# Patient Record
(demographics unavailable — no encounter records)

---

## 2024-10-11 NOTE — HISTORY OF PRESENT ILLNESS
[de-identified] : S/P right carpal and cubital tunnel release  DOS: 09/30/24 [de-identified] : Returns for follow-up. [de-identified] : Incisions are healing well with slight maceration at the area of the right wrist carpal tunnel incision however no signs of infection.  She tolerates range of motion of the right elbow and right wrist well with no discomfort.  She is able to make a full composite fist with very minimal stiffness. [de-identified] : We have discussed prophylactic antibiotic.  Proper wound care discussed.  She will commence occupational therapy to further optimize her outcome.  A prescription of [Bactrim] has been given to the patient. The risks, side effects, benefits and black box warnings were discussed.  The patient understands the risk profile and would like to take the medication. [de-identified] : 1.  Bactrim as above. 2.  Commence OT.  Follow-up in 4 weeks as per patient request.  Patient would like to discuss left-sided symptoms next visit.

## 2024-11-13 NOTE — HISTORY OF PRESENT ILLNESS
[de-identified] : S/P right carpal and cubital tunnel release DOS: 09/30/24.   [de-identified] : Valencia returns for follow-up status post right sided carpal cubital releases and is delighted.  Denies symptoms. At this point in time patient states that left-sided cubital tunnel symptoms continue to bother her.  She would like to avoid surgery. [de-identified] : Right sided incisions of healed beautifully Examination of the [left] cubital tunnel reveals discomfort with compression with associated numbness and tingling at the fingertips. There is a positive tinel. [de-identified] : ASSESSMENT: The patient comes in today with chronic exacerbated history of left-sided cubital tunnel disease.  With this in mind we have discussed treatment options the patient would like to avoid surgery.  Today she does elect for injection we have discussed the efficacy of injection for this condition.   The patient was adequately and thoroughly informed of my assessment of their current condition(s).  - This may diminish bodily function for the extremity. We discussed prognosis, tx modalities including operative and nonoperative options for the above diagnostic assessment. As always, 2nd opinion is always provided as an option.  When accessible, I was able to review other physicians note(s) including reviewing other tests, imaging results as well as personally view these results for my own interpretation.   Injection:   The risks and benefits of a steroid injection were discussed in detail. The risks include but are not limited to: pain, infection, swelling, flare response, bleeding, subcutaneous fat atrophy, skin depigmentation and/or elevation of blood sugar. The risk of incomplete resolution of symptoms, recurrence and additional intervention was reviewed and considered by the patient. The patient agreed to proceed and under a sterile prep, I injected 1 unit 6mg into 1 cc of a combination of Celestone and Lidocaine into the left cubital tunnel. The patient tolerated the injection well.  The patient was adequately and thoroughly informed of my assessment of their current condition(s).  DISCUSSION: 1.  Injection as above.  Activity modification.  Follow-up 3 months 2.  I am delighted to see she has done so well from right sided releases 3. [x]

## 2024-11-18 NOTE — HISTORY OF PRESENT ILLNESS
[FreeTextEntry1] : 75 year old female w/class 2 obesity, HTN, HLD, Pre-DM, carpal tunnel syndrome, BRIGID, OA presents for obesity medicine follow up.  HX: Pt referred by PCP. Had recent wt gain after being taken off Ozempic/Trulicity (insurance would no longer cover meds). Interested in diet/lifestyle/medication to lose weight.  Limited mobility due to chronic back pain/knee arthritis.  Motivated to improve health and energy level.   Current weight: 208 lbs Max weight: 225 lbs Ht: 5' 1''   Goals: Improve health/enregy. Would like to be 170 lbs    Interim hx: -Pt returns for 2 month f/u visit -Trialed Wellbutrin but had to discontinue quickly due to side effects.  -Never started Metformin, discussed rationale again and plans to start meds -went on a cruise for 8 days recently. been home for a week -reports poor eating while away. Finds this time of year hard and wants to wait until after the holidays to fully dedicate herself. Discussed can try to make some changes now without perfection being the goal -had sx on R hand for carpal tunnel, improved.  -has had increased fatigue since returning from trip. Didn't wear mask when away, back to wearing more consistently and sleeping better -had lost 5 lbs prior to cruise by watching what she ate. More salads, salmon, less fried foods, snacking/sweets with the exception of Halloween.  -walked a lot on the cruise and has continued walking more since home doing shopping for francesca now.  -goal: continue increasing physical activity.    Pt would like to lose weight and motivated to make changes.

## 2024-11-18 NOTE — ASSESSMENT
[FreeTextEntry1] :  Bariatric surgery history:  Obesity co-morbidities: HTN, HLD, Pre-DM, BRIGID, OA  Comorbidities improved or resolved:  Anti-obesity Medications: wegovy (in past)  Obesity medication side effects: none  75 year old female w/class 2 obesity, HTN, HLD, Pre-DM, carpal tunnel syndrome, BRIGID, OA presents for initial obesity medicine evaluation.   Plan: Recommend whole food based eating strategy limiting refined carbs and added fats. Reduce snacking Reduce UPF/takeout foods in diet. Reduce sugar sweetened beverages, increase water intake daily.  Start metformin 750mg ER 1 tab daily>increase to 2 tabs daily after 1 week Not covered for GLP-1/Failed Wellbutrin.  Would benefit from PT given limited physical mobility. Increase PA as abled Wear CPAP mask nightly. Discussed sleep hygiene. Keep phone out of room,. Aim for 6 hours at least nightly.  Meal prep/mindful eating. Discussed mapping out habit loop of snacking.     RTC in 6 weeks in person   PATIENT IS ACTIVELY ENROLLED IN AN EXERCISE AND CALORIC RESTRICTION PROGRAM AND WEIGHT LOSS/BEHAVIORAL MODIFICATION PROGRAM.

## 2024-11-18 NOTE — REASON FOR VISIT
[Follow-Up] : a follow-up visit [Home] : at home, [unfilled] , at the time of the visit. [Medical Office: ([facility])___] : at the medical office located in  [Patient] : the patient [This encounter was initiated by telehealth (audio with video) and converted to telephone (audio only) due to technical difficulties.] : This encounter was initiated by telehealth (audio with video) and converted to telephone (audio only) due to technical difficulties.

## 2024-11-18 NOTE — REVIEW OF SYSTEMS
[MED-ROS-Cons-FT] : + fatigue [MED-ROS-Resp-FT] : +SOB upon exertion [MED-ROS-Musclo-FT] : + back/knee pain

## 2025-01-27 NOTE — PHYSICAL EXAM
[General Appearance - Well Developed] : well developed [Normal Appearance] : normal appearance [General Appearance - Well Nourished] : well nourished [Normal Oropharynx] : normal oropharynx [General Appearance - In No Acute Distress] : no acute distress [IV] : IV [Neck Appearance] : the appearance of the neck was normal [Apical Impulse] : the apical impulse was normal [Heart Rate And Rhythm] : heart rate was normal and rhythm regular [Heart Sounds] : normal S1 and S2 [Heart Sounds Gallop] : no gallops [Murmurs] : no murmurs [Heart Sounds Pericardial Friction Rub] : no pericardial rub [Respiration, Rhythm And Depth] : normal respiratory rhythm and effort [Auscultation Breath Sounds / Voice Sounds] : lungs were clear to auscultation bilaterally [Bowel Sounds] : normal bowel sounds [Abdomen Soft] : soft [Abdomen Tenderness] : non-tender [Abdomen Mass (___ Cm)] : no abdominal mass palpated [Nail Clubbing] : no clubbing of the fingernails [Cyanosis, Localized] : no localized cyanosis [Petechial Hemorrhages (___cm)] : no petechial hemorrhages [] : no ischemic changes [Oriented To Time, Place, And Person] : oriented to person, place, and time [Impaired Insight] : insight and judgment were intact [Affect] : the affect was normal [Mood] : the mood was normal

## 2025-01-27 NOTE — PHYSICAL EXAM
[General Appearance - Well Developed] : well developed [Normal Appearance] : normal appearance [General Appearance - Well Nourished] : well nourished [General Appearance - In No Acute Distress] : no acute distress [Normal Oropharynx] : normal oropharynx [IV] : IV [Neck Appearance] : the appearance of the neck was normal [Apical Impulse] : the apical impulse was normal [Heart Rate And Rhythm] : heart rate was normal and rhythm regular [Heart Sounds] : normal S1 and S2 [Heart Sounds Gallop] : no gallops [Murmurs] : no murmurs [Heart Sounds Pericardial Friction Rub] : no pericardial rub [Respiration, Rhythm And Depth] : normal respiratory rhythm and effort [Auscultation Breath Sounds / Voice Sounds] : lungs were clear to auscultation bilaterally [Bowel Sounds] : normal bowel sounds [Abdomen Soft] : soft [Abdomen Tenderness] : non-tender [Abdomen Mass (___ Cm)] : no abdominal mass palpated [Nail Clubbing] : no clubbing of the fingernails [Cyanosis, Localized] : no localized cyanosis [Petechial Hemorrhages (___cm)] : no petechial hemorrhages [] : no ischemic changes [Oriented To Time, Place, And Person] : oriented to person, place, and time [Impaired Insight] : insight and judgment were intact [Affect] : the affect was normal [Mood] : the mood was normal

## 2025-01-28 NOTE — ASSESSMENT
[FreeTextEntry1] : 74 y/o female who present for sleep follow up for BRIGID, uses CPAP 8cmH2O but c/o dry mouth.  Discussed increasing humidity setting on device, which she will try.  Discussed coming for a mask fitting as this is likely due to mouth breathing during sleep.  Discussed the need to bring in data card so that we can check compliance and therapy data.  Excessive daytime sleepiness and difficulty maintaining sleep: discussed avoiding caffeine 8hrs before bed, utilizing CPAP regularly to avoid respiratory related arousal events.  Discussed sleep hygiene techniques including using the bed/chair only for sleep and to get up and read quietly if she can't re-initiate sleep.  We discussed reducing daytime naps to consolidate her sleep to nocturnal hours.  Patient is receiving her CPAP supplies regularly and following recommended cleaning procedures and changing her equipment on the recommended schedule.  After data download, if she is eligible for machine replacement, we will order a new machine at that time.

## 2025-01-28 NOTE — HISTORY OF PRESENT ILLNESS
[Obstructive Sleep Apnea] : obstructive sleep apnea [Hypersomnia With Sleep Apnea] : hypersomnia with sleep apnea [Snoring] : snoring [Witnessed Apneas] : witnessed sleep apnea [Awakes Unrefreshed] : awakening unrefreshed [Awakening With Dry Mouth] : awakening with dry mouth [Daytime Somnolence] : daytime somnolence [DMS] : DMS [To Bed: ___] : ~he/she~ goes to bed at [unfilled] [Arises: ___] : arises at [unfilled] [Sleep Onset Latency: ___ minutes] : sleep onset latency of [unfilled] minutes reported [Nocturnal Awakenings: ___] : ~he/she~ typically has [unfilled] nocturnal awakenings [TST: ___] : Total sleep time is [unfilled] [Daytime Sleep: ___] : daytime sleep: [unfilled] [Date: ___] : Date of most recent diagnostic polysomnogram: [unfilled] [AHI: ___ per hour] : Apnea-hypopnea index:  [unfilled] per hour [T90%: ___] : T90%: [unfilled]% [CPAP: ___ cmH2O] : CPAP: [unfilled] cmH2O [FreeTextEntry1] :   This ia a 74 y/o female who presents for follow up of BRIGID.  She was initially diagnosed 11/14/2016 by PSG which showed an AHI 18.8 and started on CPAP at 8cmH20 which is supplied by [employer].  She uses a nasal mask and complains of nightly dryness. Her machine data is not available and will need to be downloaded from the SD card from her [employer] which was replaced under the recall on 3/2/2023.  The patient will bring the card to the office for download the next time she is in the area.     The patient uses her CPAP intermittently and on most nights she sleeps in a recliner to start the night as she suffers from low back pain.  She does report feeling better when she uses the CPAP.  She denies morning headaches, sensations of gasping or choking, chest pain, or palpitations.  She reports no changes to her medical history or new medications. She was taking Wegovy but it was discontinued because her coverage ceased for the medication. She reports her usual time to initiate sleep is 8pm and she start her day at 7am but does awaken during the night.  She reports no difficulty initiating sleep but after waking (on average 2x per night) she has difficulty returning to sleep, sometimes taking several hours to fall back asleep.  She reports taking daily naps twice per day for 40-60 minutes each time.    Caffeine intake-she drinks caffeinated tea in the mornings and has caffeinated diet coke in the evening.  She was advised to avoid caffeine intake 8 hrs before bedtime.   EPWORTH SLEEPINESS SCALE   How likely are you to doze off or fall asleep in the situations described below, in contrast to feeling just tired? This refers to your usual way of life in recent times. Even if you haven't done some of these things recently, try to work out how they would have affected you. Use the following scale to choose one most appropriate number for each situation.   Chance of dozing.............Situation 3......................................Sitting and reading 3........................................Watching TV 0........................................Sitting inactive in a public place (eg a theatre or a meeting) 0........................................As a passenger in a car for an hour without a break 3........................................Lying down to rest in the afternoon when circumstances permit 0........................................Sitting and talking to someone 2........................................Sitting quietly after lunch without alcohol 0........................................In a car, while stopped for a few minutes in traffic   11........................................TOTAL SCORE   0 = Never would doze 1 = Slight chance of dozing 2 = Moderate chance of dozing 3 = High chance of dozing __________________________________________ [Frequent Nocturnal Awakening] : no nocturnal awakening [Unintentional Sleep while Active] : no unintentional sleep while active [Unintentional Sleep While Inactive] : no unintentional sleep while inactive [Awakes with Headache] : no headache upon awakening [Recent  Weight Gain] : no recent weight gain [DIS] : no DIS [Unusual Sleep Behavior] : no unusual sleep behavior [Hypersomnolence] : no hypersomnolence [Cataplexy] : no cataplexy [Sleep Paralysis] : no sleep paralysis [Hypnagogic Hallucinations] : no hallucinations when falling asleep [Hypnopompic Hallucinations] : no hallucinations when awakening [ESS] : 11

## 2025-01-28 NOTE — HISTORY OF PRESENT ILLNESS
[Obstructive Sleep Apnea] : obstructive sleep apnea [Hypersomnia With Sleep Apnea] : hypersomnia with sleep apnea [Snoring] : snoring [Witnessed Apneas] : witnessed sleep apnea [Awakes Unrefreshed] : awakening unrefreshed [Awakening With Dry Mouth] : awakening with dry mouth [Daytime Somnolence] : daytime somnolence [DMS] : DMS [To Bed: ___] : ~he/she~ goes to bed at [unfilled] [Arises: ___] : arises at [unfilled] [Sleep Onset Latency: ___ minutes] : sleep onset latency of [unfilled] minutes reported [Nocturnal Awakenings: ___] : ~he/she~ typically has [unfilled] nocturnal awakenings [TST: ___] : Total sleep time is [unfilled] [Daytime Sleep: ___] : daytime sleep: [unfilled] [Date: ___] : Date of most recent diagnostic polysomnogram: [unfilled] [AHI: ___ per hour] : Apnea-hypopnea index:  [unfilled] per hour [T90%: ___] : T90%: [unfilled]% [CPAP: ___ cmH2O] : CPAP: [unfilled] cmH2O [FreeTextEntry1] :   This ia a 76 y/o female who presents for follow up of BRIGID.  She was initially diagnosed 11/14/2016 by PSG which showed an AHI 18.8 and started on CPAP at 8cmH20 which is supplied by [employer].  She uses a nasal mask and complains of nightly dryness. Her machine data is not available and will need to be downloaded from the SD card from her [employer] which was replaced under the recall on 3/2/2023.  The patient will bring the card to the office for download the next time she is in the area.     The patient uses her CPAP intermittently and on most nights she sleeps in a recliner to start the night as she suffers from low back pain.  She does report feeling better when she uses the CPAP.  She denies morning headaches, sensations of gasping or choking, chest pain, or palpitations.  She reports no changes to her medical history or new medications. She was taking Wegovy but it was discontinued because her coverage ceased for the medication. She reports her usual time to initiate sleep is 8pm and she start her day at 7am but does awaken during the night.  She reports no difficulty initiating sleep but after waking (on average 2x per night) she has difficulty returning to sleep, sometimes taking several hours to fall back asleep.  She reports taking daily naps twice per day for 40-60 minutes each time.    Caffeine intake-she drinks caffeinated tea in the mornings and has caffeinated diet coke in the evening.  She was advised to avoid caffeine intake 8 hrs before bedtime.   EPWORTH SLEEPINESS SCALE   How likely are you to doze off or fall asleep in the situations described below, in contrast to feeling just tired? This refers to your usual way of life in recent times. Even if you haven't done some of these things recently, try to work out how they would have affected you. Use the following scale to choose one most appropriate number for each situation.   Chance of dozing.............Situation 3......................................Sitting and reading 3........................................Watching TV 0........................................Sitting inactive in a public place (eg a theatre or a meeting) 0........................................As a passenger in a car for an hour without a break 3........................................Lying down to rest in the afternoon when circumstances permit 0........................................Sitting and talking to someone 2........................................Sitting quietly after lunch without alcohol 0........................................In a car, while stopped for a few minutes in traffic   11........................................TOTAL SCORE   0 = Never would doze 1 = Slight chance of dozing 2 = Moderate chance of dozing 3 = High chance of dozing __________________________________________ [Frequent Nocturnal Awakening] : no nocturnal awakening [Unintentional Sleep while Active] : no unintentional sleep while active [Unintentional Sleep While Inactive] : no unintentional sleep while inactive [Awakes with Headache] : no headache upon awakening [Recent  Weight Gain] : no recent weight gain [DIS] : no DIS [Unusual Sleep Behavior] : no unusual sleep behavior [Hypersomnolence] : no hypersomnolence [Cataplexy] : no cataplexy [Sleep Paralysis] : no sleep paralysis [Hypnagogic Hallucinations] : no hallucinations when falling asleep [Hypnopompic Hallucinations] : no hallucinations when awakening [ESS] : 11

## 2025-01-28 NOTE — REVIEW OF SYSTEMS
[EDS: ESS=____] : daytime somnolence: ESS=[unfilled] [Fatigue] : fatigue [A.M. Dry Mouth] : a.m. dry mouth [Obesity] : obesity [Difficulty Maintaining Sleep] : difficulty maintaining sleep [Negative] : Genitourinary [Recent Wt Gain (___ Lbs)] : no recent weight gain [Recent Wt Loss (___ Lbs)] : no recent weight loss [Postnasal Drip] : no postnasal drip [Orthopnea] : no orthopnea [Chest Pain] : no chest pain [Palpitations] : no palpitations [Edema] : ~T edema was not present [CHF] : no congestive heart failure [Thyroid Disease] : no thyroid disease [A.M. Headache] : no headache present upon awakening [Leg Dysesthesias] : no leg dysesthesias [Lower Extremity Discomfort] : no lower extremity discomfort [Irresistible urge to move legs] : no irresistible urge to move legs because of lower extremity discomfort [LE discomfort relieved by movement] : lower extremity discomfort not relieved by movement [Late day/ Evening symptoms] : no late day/evening symptoms [Sleep Disturbances due to LE symptoms] : ~T no sleep disturbances due to lower extremity symptoms [Unusual Sleep Behavior] : no unusual sleep behavior [Hypersomnolence] : not sleeping much more than usual [Cataplexy] :  no cataplexy [Hypnogogic Hallucinations] : no hypnogogic hallucinations [Hypnopompic Hallucinations] : no hypnopompic hallucinations [Sleep Paralysis] : no sleep paralysis

## 2025-02-24 NOTE — PHYSICAL EXAM
[de-identified] : Examination of the [left] cubital tunnel reveals discomfort with compression with associated numbness and tingling at the fingertips. There is a positive tinel. [de-identified] : [4] views of [bilateral hands and wrists] were reviewed today in my office and were seen by me and discussed with the patient. These [show findings consistent with bilateral basal joint OA and findings of IP joint OA].

## 2025-02-24 NOTE — HISTORY OF PRESENT ILLNESS
[FreeTextEntry1] : Valencia is a very pleasant 75-year-old female who presents today with chronic exacerbated left elbow discomfort as well as small and ring finger numbness and tingling that is bothersome during both daytime and nighttime as well as affecting her ADLs.  Symptoms are affecting her sleep.

## 2025-02-24 NOTE — ASSESSMENT
[FreeTextEntry1] : ASSESSMENT: The patient comes in today with chronic exacerbated left elbow discomfort as well as small and ring finger numbness and tingling that is bothersome during both daytime and nighttime as well as affecting her ADLs.  Symptoms are affecting her sleep.  Symptoms today are consistent with left elbow cubital tunnel syndrome.  Treatment modalities were discussed, the patient elects for an injection.  We have discussed activity modifications as well as extension bracing.   The patient was adequately and thoroughly informed of my assessment of their current condition(s).  - This may diminish bodily function for the extremity.  We discussed prognosis, treatment modalities including operative and nonoperative options for the above diagnostic assessment. As always, 2nd opinion is always provided as an option. For this, when accessible, I was able to review other physicians note(s) including reviewing other tests, imaging results as well as personally view these results for my own interpretation.      Injection:   The risks and benefits of a steroid injection were discussed in detail. The risks include but are not limited to: pain, infection, swelling, flare response, bleeding, subcutaneous fat atrophy, skin depigmentation and/or elevation of blood sugar. The risk of incomplete resolution of symptoms, recurrence and additional intervention was reviewed and considered by the patient.  The patient agreed to proceed and under a sterile prep, I injected 1 unit (6mg) into 1 cc of a combination of Celestone and Lidocaine into the [left cubital tunnel]. The patient tolerated the injection well.   The patient was adequately and thoroughly informed of my assessment of their current condition(s).   DISCUSSION: 1.  Injection as above.  Activity modifications.  Extension bracing discussed.  Follow-up in 3 months. 2. [x] 3. [x]

## 2025-03-14 NOTE — HEALTH RISK ASSESSMENT
[No Retinopathy] : No retinopathy [Patient reported mammogram was normal] : Patient reported mammogram was normal [Patient declined PAP Smear] : Patient declined PAP Smear [Patient reported bone density results were abnormal] : Patient reported bone density results were abnormal [Patient reported colonoscopy was normal] : Patient reported colonoscopy was normal [No] : No [No falls in past year] : Patient reported no falls in the past year [Yes] : takes [Never] : Never [NO] : No [None] : None [Retired] : retired [Feels Safe at Home] : Feels safe at home [Fully functional (bathing, dressing, toileting, transferring, walking, feeding)] : Fully functional (bathing, dressing, toileting, transferring, walking, feeding) [Fully functional (using the telephone, shopping, preparing meals, housekeeping, doing laundry, using] : Fully functional and needs no help or supervision to perform IADLs (using the telephone, shopping, preparing meals, housekeeping, doing laundry, using transportation, managing medications and managing finances) [Smoke Detector] : smoke detector [Seat Belt] :  uses seat belt [With Patient/Caregiver] : , with patient/caregiver [Relationship: ___] : Relationship: [unfilled] [Nearly Every Day (3)] : 5.) Poor appetite or overeating? Nearly every day [Several Days (1)] : 7.) Trouble concentrating on things, such as reading a newspaper or watching television? Several days [Not at All (0)] : 9.) Thoughts that you would be off dead or of hurting yourself in some way? Not at all [Moderate] : Severity of Depression is Moderate [Not at all] : How difficult have these problems made it for you to do your work, take care of things at home, or get along with people? Not at all [PHQ-9 Positive] : PHQ-9 Positive [I have developed a follow-up plan documented below in the note.] : I have developed a follow-up plan documented below in the note. [Time Spent: ___ Minutes] : I spent [unfilled] minutes performing a depression screening for this patient. [Audit-CScore] : 0 [de-identified] : unable to exercise due to  [de-identified] : regular varied [[id number]] : 12 [EyeExamDate] : 10/24 [Change in mental status noted] : No change in mental status noted [Reports changes in hearing] : Reports no changes in hearing [Reports changes in vision] : Reports no changes in vision [MammogramDate] : 12/24 [MammogramComments] : BIRADs 3 - f/u in 1 yr advised [BoneDensityDate] : 03/24 [BoneDensityComments] : osteopenia [ColonoscopyDate] : 01/19 [ColonoscopyComments] : was due in 2023 [AdvancecareDate] : 03/25

## 2025-03-14 NOTE — ASSESSMENT
[FreeTextEntry1] : 73 yo F with h/o HTN, HLD, preDM, CTS s/p surg, obesity, BRIGID, leukocytosis, thrombocytosis, multiarticular arthritis presents for CPE also found to have worsening CHENG, fatigue  #Morbid obesity comorbidity associated includes HLD, back pain, BRIGID, multiarticular joint pain. She was disappointed when the Ozempic was no longer covered. Discussed options - since BRIGID is now considered an indication for Zepbound - will try getting it approved. Will keep her updated. If we cannot get approval, will need to consider alternative treatment.   #Fatigue Pt states she feels it all the time; likely some component of BRIGID since her CPAP machine is not working that well. Patient will go to pulm to try to fix the issues. Will also check labs to r/o secondary causes.   I conducted an annual depression screen using the PHQ 9 and discussed results with the patient during this visit - score of 12. Screening suggestive of diagnosis of mild-mod MDD. Time spent: 8 minutes Discussed option of trial on Wellbutrin - she reports has tried in the past but had a bad reaction to it. PHQ score is skewed by poor sleep (likely due to incompletely treated BRIGID) and resultant fatigue. Will work on better treating BRIGID and then reassessing.   #Back pain No red flags. Needs further assessment and treatment - will refer patient to spine center for further imaging, workup  #Dyspnea Markedly worse than usual. No hx of pulmonary issues.  Pt has not seen a cardiologist; will refer for further evaluation - r/o occult CAD. Prior stress test but not for many years. Referral provided and contact information as well.   #hcm due for her prevnar 20, cmp, lipid profile, a1c

## 2025-03-14 NOTE — REVIEW OF SYSTEMS
[Fatigue] : fatigue [Shortness Of Breath] : shortness of breath [Dyspnea on Exertion] : dyspnea on exertion [Joint Pain] : joint pain [Depression] : depression [Negative] : Heme/Lymph [Chest Pain] : no chest pain [Suicidal] : not suicidal [Insomnia] : no insomnia [Anxiety] : no anxiety [FreeTextEntry5] : ++CHENG [de-identified] : ++PHQ 9 of 10

## 2025-03-14 NOTE — REVIEW OF SYSTEMS
[Fatigue] : fatigue [Shortness Of Breath] : shortness of breath [Dyspnea on Exertion] : dyspnea on exertion [Joint Pain] : joint pain [Depression] : depression [Negative] : Heme/Lymph [Chest Pain] : no chest pain [Suicidal] : not suicidal [Insomnia] : no insomnia [Anxiety] : no anxiety [FreeTextEntry5] : ++CHENG [de-identified] : ++PHQ 9 of 10

## 2025-03-14 NOTE — HEALTH RISK ASSESSMENT
[No Retinopathy] : No retinopathy [Patient reported mammogram was normal] : Patient reported mammogram was normal [Patient declined PAP Smear] : Patient declined PAP Smear [Patient reported bone density results were abnormal] : Patient reported bone density results were abnormal [Patient reported colonoscopy was normal] : Patient reported colonoscopy was normal [No] : No [No falls in past year] : Patient reported no falls in the past year [Yes] : takes [Never] : Never [NO] : No [None] : None [Retired] : retired [Feels Safe at Home] : Feels safe at home [Fully functional (bathing, dressing, toileting, transferring, walking, feeding)] : Fully functional (bathing, dressing, toileting, transferring, walking, feeding) [Fully functional (using the telephone, shopping, preparing meals, housekeeping, doing laundry, using] : Fully functional and needs no help or supervision to perform IADLs (using the telephone, shopping, preparing meals, housekeeping, doing laundry, using transportation, managing medications and managing finances) [Smoke Detector] : smoke detector [Seat Belt] :  uses seat belt [With Patient/Caregiver] : , with patient/caregiver [Relationship: ___] : Relationship: [unfilled] [Nearly Every Day (3)] : 5.) Poor appetite or overeating? Nearly every day [Several Days (1)] : 7.) Trouble concentrating on things, such as reading a newspaper or watching television? Several days [Not at All (0)] : 9.) Thoughts that you would be off dead or of hurting yourself in some way? Not at all [Moderate] : Severity of Depression is Moderate [Not at all] : How difficult have these problems made it for you to do your work, take care of things at home, or get along with people? Not at all [PHQ-9 Positive] : PHQ-9 Positive [I have developed a follow-up plan documented below in the note.] : I have developed a follow-up plan documented below in the note. [Time Spent: ___ Minutes] : I spent [unfilled] minutes performing a depression screening for this patient. [Audit-CScore] : 0 [de-identified] : unable to exercise due to  [de-identified] : regular varied [[id number]] : 12 [EyeExamDate] : 10/24 [Change in mental status noted] : No change in mental status noted [Reports changes in hearing] : Reports no changes in hearing [Reports changes in vision] : Reports no changes in vision [MammogramDate] : 12/24 [MammogramComments] : BIRADs 3 - f/u in 1 yr advised [BoneDensityDate] : 03/24 [BoneDensityComments] : osteopenia [ColonoscopyDate] : 01/19 [ColonoscopyComments] : was due in 2023 [AdvancecareDate] : 03/25

## 2025-03-14 NOTE — HISTORY OF PRESENT ILLNESS
[de-identified] : 73 yo F with h/o HTN, HLD, preDM, CTS s/p surg, obesity, BRIGID, leukocytosis, thrombocytosis, multiarticular arthritis Chart reviewed today in preparation for visit.   s/p CTS surgery Seen by pulm 1/27/25 - for f/u of BRIGID RE obesity: seeing obesity medicine  RE CTS: has been seeing hand specialist, Dr Parkinson. s/p injection x 2 RE obesity: on GLP1 (Trulicity - then Wegovy) - lost about 35 lbs as of last visit but noticed wt bp by 6 lbs at last hand surg visit. Last rx was in Feb 2024 - since then, insurance has refused to cover. She is very frustrated and wonders if there are any alternative medications or strategies.  RE BRIGID: uses CPAP but not every night RE HTN: Generally under good control on amlodipine and valsartan hand 10/160 RE HLD: On atorvastatin 20 mg daily.  No side effects   Prior 2/20/24 RE exercise: main exercise is walking - cannot do much more than that.  RE obesity: has lost 34 lbs. so far. Has not lost any more weight but finds exercise difficult due to back pain.  Used to do water exercise and enjoyed that.  RE BRIGID: uses CPAP 4-5 times a week. does not bring on vacation.  RE bilat hand pain and numbness: had surgery in the past - was told would last 10 years and more time has passed than that. She is ready to go back for a reassessment - thinks she may need revision surgery.   Prior 10/17/23 On Ozempic since 8/22 Last ophtho visit: 10/3/23 - no retinopathy change in vision: no lump in neck? change in voice? difficulty swallowing? - no abdominal pain? - no She is feeling great - went last month to [place] and [place].  Had a wonderful time. Did a lot of walking every day. Even though she had a lot to eat, got so much exercise that she actually still lost a couple of pounds.  she reports that now her pharmacy is out of Ozempic - unclear when they will have it back in stock, so she is out of medication.  Will send rx for Wegovy to her pharmacy as an alternative - she is agreeable to the change.   RE HTN: In target range on current meds.  Continue amlodipine valsartan 10/160 daily.  RE HLD: On atorvastatin 20 mg daily.  No side effects.  Due to check labs today.  No change in meds. Re: Pre-DM: Continues to work on diet and lifestyle modifications.  Not on any medication.  Due to recheck hemoglobin A1c today. Re: Leukocytosis/thrombocytosis: Recheck CBC today. Re: Screening for breast cancer: Due for mammogram and ultrasound.  Prescriptions supplied= she will call to schedule appointment. Got both flu shot and the COVID booster early September. Follow-up for CPE. 3/12: Patient feels tired and has gained weight since being "kicked off" of ozempic. Still endorses fatigue all day despite using it. Not able to get the best sleep while using it. There could be something wrong with her cpap macine and will have it looked at by pulm today. PHQ9 10 likely muddied by persistent fatigue.  Patient also has back pain takes multiple NSAIDS, tylenol OTC throughout the day. Can't exercise due to pain, standing and walking. Patient also limited by CHENG she states that she gets short of breath very easily and is not able to climb up stairs or walk down the hallway-ray?-[facility] [place] Patient has also tried wellbutrin in the past which made her feel ill and she had since stopped taking it.  Patient main concern is receive a GLP-1 agonist as this would help her lose weight and experience back pain. Patient is wondering of if would be covered due to her BRIGID.

## 2025-03-14 NOTE — HISTORY OF PRESENT ILLNESS
[de-identified] : 71 yo F with h/o HTN, HLD, preDM, CTS s/p surg, obesity, BRIGID, leukocytosis, thrombocytosis, multiarticular arthritis Chart reviewed today in preparation for visit.   s/p CTS surgery Seen by pulm 1/27/25 - for f/u of BRIGID RE obesity: seeing obesity medicine  RE CTS: has been seeing hand specialist, Dr Parkinson. s/p injection x 2 RE obesity: on GLP1 (Trulicity - then Wegovy) - lost about 35 lbs as of last visit but noticed wt bp by 6 lbs at last hand surg visit. Last rx was in Feb 2024 - since then, insurance has refused to cover. She is very frustrated and wonders if there are any alternative medications or strategies.  RE BRIGID: uses CPAP but not every night RE HTN: Generally under good control on amlodipine and valsartan hand 10/160 RE HLD: On atorvastatin 20 mg daily.  No side effects   Prior 2/20/24 RE exercise: main exercise is walking - cannot do much more than that.  RE obesity: has lost 34 lbs. so far. Has not lost any more weight but finds exercise difficult due to back pain.  Used to do water exercise and enjoyed that.  RE BRIGID: uses CPAP 4-5 times a week. does not bring on vacation.  RE bilat hand pain and numbness: had surgery in the past - was told would last 10 years and more time has passed than that. She is ready to go back for a reassessment - thinks she may need revision surgery.   Prior 10/17/23 On Ozempic since 8/22 Last ophtho visit: 10/3/23 - no retinopathy change in vision: no lump in neck? change in voice? difficulty swallowing? - no abdominal pain? - no She is feeling great - went last month to [place] and [place].  Had a wonderful time. Did a lot of walking every day. Even though she had a lot to eat, got so much exercise that she actually still lost a couple of pounds.  she reports that now her pharmacy is out of Ozempic - unclear when they will have it back in stock, so she is out of medication.  Will send rx for Wegovy to her pharmacy as an alternative - she is agreeable to the change.   RE HTN: In target range on current meds.  Continue amlodipine valsartan 10/160 daily.  RE HLD: On atorvastatin 20 mg daily.  No side effects.  Due to check labs today.  No change in meds. Re: Pre-DM: Continues to work on diet and lifestyle modifications.  Not on any medication.  Due to recheck hemoglobin A1c today. Re: Leukocytosis/thrombocytosis: Recheck CBC today. Re: Screening for breast cancer: Due for mammogram and ultrasound.  Prescriptions supplied= she will call to schedule appointment. Got both flu shot and the COVID booster early September. Follow-up for CPE. 3/12: Patient feels tired and has gained weight since being "kicked off" of ozempic. Still endorses fatigue all day despite using it. Not able to get the best sleep while using it. There could be something wrong with her cpap macine and will have it looked at by pulm today. PHQ9 10 likely muddied by persistent fatigue.  Patient also has back pain takes multiple NSAIDS, tylenol OTC throughout the day. Can't exercise due to pain, standing and walking. Patient also limited by CHENG she states that she gets short of breath very easily and is not able to climb up stairs or walk down the hallway-ray?-[facility] spine [place] Patient has also tried wellbutrin in the past which made her feel ill and she had since stopped taking it.  Patient main concern is receive a GLP-1 agonist as this would help her lose weight and experience back pain. Patient is wondering of if would be covered due to her BRIGID.

## 2025-03-14 NOTE — PHYSICAL EXAM
[Normal] : no joint swelling and grossly normal strength and tone [Normal Sclera/Conjunctiva] : normal sclera/conjunctiva [EOMI] : extraocular movements intact [Pedal Pulses Present] : the pedal pulses are present [Normal Appearance] : normal in appearance [No Masses] : no palpable masses [No Axillary Lymphadenopathy] : no axillary lymphadenopathy [Normal Axillary Nodes] : no axillary lymphadenopathy [Normal Anterior Cervical Nodes] : no anterior cervical lymphadenopathy [No Spinal Tenderness] : no spinal tenderness [Grossly Normal Strength/Tone] : grossly normal strength/tone [Coordination Grossly Intact] : coordination grossly intact [Normal Affect] : the affect was normal

## 2025-03-18 NOTE — PHYSICAL EXAM
[de-identified] : CONSTITUTIONAL: Patient is a very pleasant individual who is well-nourished and appears stated age. PSYCHIATRIC: Alert and oriented times three and in no apparent distress, and participates with orthopedic evaluation well. HEAD: Atraumatic and nonsyndromic in appearance. EENT: No thyromegaly, EOMI. RESPIRATORY: Respiratory rate is regular, not dyspneic on examination. LYMPHATICS: There is no cervical or axillary lymphadenopathy. INTEGUMENTARY: Skin is clean, dry, and intact to bilateral lower extremities. VASCULAR: There is brisk capillary refill about the bilateral Lower Extremities with 2+ DP Pulse  Palpation: Mild paraspinal muscle tenderness bilateral No pain with internal/external range of motion of bilateral hips  Muscle Strength Testing: Hip Flexion: 5/5 B/L Knee Extension: 5/5 B/L Knee Flexion: 5/5 B/L Dorsiflexion: 5/5 B/L EHL: 5/5 B/L Plantarflexion: 5/5 B/L  Sensation: SILT L2-S1 B/L except: none  Reflexes: 2+ Quadriceps/Achilles  Gait: Normal gait with use of single-point cane  Special Testing:  Negative SLR BLLE Negative clonus BLLE  [de-identified] : Standing AP, lateral, flexion and extension radiographs of the lumbar spine performed on 3/18/2025 in the Radiology Department at [facility] [place] for the indication of low back pain are reviewed.  These studies demonstrate performed in AP film no osteoarthritis bilateral hips Lateral regards demonstrates maintenance of lumbar lordosis there is grade 1 slight anterolisthesis of L4 and L5 measuring 2 mm with flexion extension radiographs there is moderate to severe disc height loss at L4-5 L5-S1 with facet arthropathy bilateral

## 2025-03-18 NOTE — HISTORY OF PRESENT ILLNESS
[de-identified] : CC: Low back pain, gluteal pain  hpi: 75-year-old female presenting today with longstanding history of severe low back and left sided gluteal pain.  She states she has had the symptoms for several years today.  Worsening over the past year.  Mainly has in the low back radiates left gluteal region.  She has been going to physical therapy states symptoms have not improved with therapy.  She has been taking anti-inflammatory medications that are improving is isolated to the lower lumbar spine mainly in the midline bilateral worse on the left rating into left gluteal posterior aspect of the top of the hamstring.  She states the pain is worse with walking and standing she is having difficulty ambulating she has to sit more she is becoming more sedentary.  She is using the cane to help with her ambulation.  She has a right lower EXTR radicular symptoms she rates pain about 8 out of 10 in severity.

## 2025-03-18 NOTE — ASSESSMENT
[FreeTextEntry1] : 75-year-old female with lumbar spondylosis left lower extremity radiculopathy   Discussed with anjali believe her back symptoms related to her lumbar spondylosis possiblee left lower extremity radiculopathy that is causing her significant issues her quality of life she is inability to ambulate and she is difficulty with exercising and performing her ADLs she has failed to respond to conservative measures including physical therapy, activity restrictions and medications therefore the next best Depp is to move forward with a lumbar MRI for diagnostic and therapeutic management I will see her back after the MRI to go over the results and discuss further options likely related to injections

## 2025-04-01 NOTE — PHYSICAL EXAM
[de-identified] : [facility] axial sagittal T1-T2 steroid images demonstrates mild disc bulging L2-3 and L3-4 Incidental L3 hemangioma L4-5 there is disc degeneration broad-based disc bulging severe facet arthropathy bilateral L5-S1 disc degeneration bilateral facet arthropathy no significant nerve compression throughout

## 2025-04-01 NOTE — HISTORY OF PRESENT ILLNESS
[de-identified] : cc: Left-sided low back pain  hpi: 75-year-old female presenting today to review her lumbar MRI.  Valencia has been dealing with severe left-sided low back pain rating into left gluteal region she has been dealing with the symptoms for years is worse throughout the day worse at nighttime worse with activities she has been suffering from severe pain she like to consider other options for better pain management.

## 2025-04-01 NOTE — DISCUSSION/SUMMARY
[de-identified] : 75-year-old female with lumbar spondylosis  I discussed with Nichole that I do not believe her symptoms are radicular in nature she has no signs of nerve compression this is likely related to the facet arthropathy lumbar spondylosis throughout the lumbar spine possible SI joint pathology She would like to consider injection moving forward therefore referral was placed to [facility] for consideration of lumbar procedure injection I will see her back afterwards 2 to 3 weeks to see how she is doing and discuss further options

## 2025-04-30 NOTE — DISCUSSION/SUMMARY
[FreeTextEntry1] : Stress and echo given progressive SOB Same medications for now. BP and LDL at goal.  [EKG obtained to assist in diagnosis and management of assessed problem(s)] : EKG obtained to assist in diagnosis and management of assessed problem(s)

## 2025-04-30 NOTE — HISTORY OF PRESENT ILLNESS
[FreeTextEntry1] : Ms. KEYES presents for the evaluation of CHENG Ms. Keyes notes she has had CHENG for over a year. It has been getting progressively worse. Now she gets SOB just moving around the kitchen.  BRIGID on CPAP. She is tolerant.  Just started Zepbound

## 2025-06-03 NOTE — ASSESSMENT
[FreeTextEntry1] : ASSESSMENT: The patient comes in today with chronic severely exacerbated worsening symptoms of recurring carpal tunnel disease and cubital tunnel disease.  We have discussed treatment modalities and has had meaningful relief with injections however with recurrence.  She has had carpal tunnel release 20 years prior.  At this point in time we have discussed treatment options once again.  She elects for injection  Injection procedure for left cubital tunnel:   The risks and benefits of a steroid injection were discussed in detail. The risks include but are not limited to: pain, infection, swelling, flare response, bleeding, subcutaneous fat atrophy, skin depigmentation and/or elevation of blood sugar. The risk of incomplete resolution of symptoms, recurrence and additional intervention was reviewed and considered by the patient. The patient agreed to proceed and under a sterile prep, I injected 1 unit 6mg into 1 cc of a combination of Celestone and Lidocaine into the above stated location for the procedure. The patient tolerated the injection well.   The patient was adequately and thoroughly informed of my assessment of their current condition(s).  - This may diminish bodily function for the extremity. We discussed prognosis, tx modalities including operative and nonoperative options for the above diagnostic assessment. As always, 2nd opinion is always provided as an option.  When accessible, I was able to review other physicians note(s) including reviewing other tests, imaging results as well as personally view these results for my own interpretation.  The patient was adequately and thoroughly informed of my assessment of their current condition(s).  DISCUSSION: 1.  Left cubital nerve tunnel injection as above.  Activity modification.  Follow-up 3 months as requested 2.  Positive response. 3.  We have discussed left cubital tunnel surgery as an option

## 2025-06-03 NOTE — PHYSICAL EXAM
[de-identified] : Examination of the [bilateral] cubital tunnel reveals discomfort with compression with associated numbness and tingling at the fingertips. There is a positive tinel. Examination of the [bilateral] wrist reveals discomfort with compression at the level of the volar carpal tunnel eliciting numbness/tingling throughout the fingertips   [de-identified] : [4] views of [bilateral hands and wrists] were reviewed today in my office and were seen by me and discussed with the patient.  These [show findings consistent with bilateral basal joint OA and findings of IP joint OA]

## 2025-06-03 NOTE — HISTORY OF PRESENT ILLNESS
[FreeTextEntry1] : Valencia is a pleasant 75-year-old female who presents today with a chronic worsening history of bilateral elbow wrist and hand discomfort numbness and tingling.  She has had carpal tunnel surgery 20 years prior.  She is weak and dropping objects Describes tremendous improvement with injections however with recurrence.

## 2025-06-19 NOTE — ASSESSMENT
[FreeTextEntry1] : 71 yo F with h/o HTN, HLD, preDM, CTS s/p surg, obesity, BRIGID, leukocytosis, thrombocytosis, multiarticular arthritis Chart reviewed today in preparation for visit.   RE back pain: Seeing Pain specialist for back.  Tried epidural which didnt help.  Now trying nerve block.  Dr Delatorre - sees in [place].  Also given pain medication - she uses sparingly.  will call with name.   RE obesity: Using Zepboound - currently on 5 mg dose. has lost 10 lbs. Screened carefully - not having any Zepbound related side effects (i.e no abd pain, no sore throat, change in voice, neck nodules, etc). She is ready to titrate up on dose.  Increasing to 7.5 weekly.  She knows to call immed for any AE. Requesting it be sent to local [facility] instead of mail order.    RE BRIGID - using CPAP nightly.  has a new machine. very comfortable. mask over nose only. Seen by pulm 1/27/25 - for f/u of BRIGID RE Immunization: got most recent COVID booster  f/u 3 months.  sooner if she is ready to increase Zepbound to next higher dose.

## 2025-06-19 NOTE — PHYSICAL EXAM
[No Acute Distress] : no acute distress [Well-Appearing] : well-appearing [Normal Sclera/Conjunctiva] : normal sclera/conjunctiva [EOMI] : extraocular movements intact [No Respiratory Distress] : no respiratory distress  [Clear to Auscultation] : lungs were clear to auscultation bilaterally [Pedal Pulses Present] : the pedal pulses are present [No Edema] : there was no peripheral edema [Soft] : abdomen soft [Non Tender] : non-tender [Normal Anterior Cervical Nodes] : no anterior cervical lymphadenopathy [No CVA Tenderness] : no CVA  tenderness [Grossly Normal Strength/Tone] : grossly normal strength/tone [Normal] : no joint swelling and grossly normal strength and tone [Coordination Grossly Intact] : coordination grossly intact [Normal Affect] : the affect was normal

## 2025-06-19 NOTE — ASSESSMENT
[FreeTextEntry1] : 71 yo F with h/o HTN, HLD, preDM, CTS s/p surg, obesity, BRIGID, leukocytosis, thrombocytosis, multiarticular arthritis Chart reviewed today in preparation for visit.   RE back pain: Seeing Pain specialist for back.  Tried epidural which didnt help.  Now trying nerve block.  Dr Delatorre - sees in [place].  Also given pain medication - she uses sparingly.  will call with name.   RE obesity: Using Zepboound - currently on 5 mg dose. has lost 10 lbs. Screened carefully - not having any Zepbound related side effects (i.e no abd pain, no sore throat, change in voice, neck nodules, etc). She is ready to titrate up on dose.  Increasing to 7.5 weekly.  She knows to call immed for any AE. [facility].    RE BRIGID - using CPAP nightly.  has a new machine. very comfortable. mask over nose only. Seen by pulm 1/27/25 - for f/u of BRIGID RE Immunization: got most recent COVID booster  f/u 3 months.  sooner if she is ready to increase Zepbound to next higher dose.

## 2025-06-19 NOTE — HISTORY OF PRESENT ILLNESS
[de-identified] : 71 yo F with h/o HTN, HLD, preDM, CTS s/p surg, obesity, BRIGID, leukocytosis, thrombocytosis, multiarticular arthritis Chart reviewed today in preparation for visit.   6/19/25 Seeing Pain specialist for back.  Tried epidural which didnt help.  Now trying nerve block.  Dr Delatorre - sees in [place].  Also given pain medication - she uses sparingly.  will call with name.  s/p CTS surgery RE obesity: seeing obesity medicine - using Zepboound - has lost 10 lbs. No side effects. RE BRIGID - using CPAP nightlyy.  has a new machine. very comfortable. mask over nose only. Seen by pulm 1/27/25 - for f/u of OSARE Immunization: got most recent COVID booster  Prior RE CTS: has been seeing hand specialist, Dr Parkinson. s/p injection x 2 RE obesity: on GLP1 (Trulicity - then Wegovy) - lost about 35 lbs as of last visit but noticed wt bp by 6 lbs at last hand surg visit. Last rx was in Feb 2024 - since then, insurance has refused to cover. She is very frustrated and wonders if there are any alternative medications or strategies.  RE BRIGID: uses CPAP but not every night RE HTN: Generally under good control on amlodipine and valsartan hand 10/160 RE HLD: On atorvastatin 20 mg daily.  No side effects   Prior 2/20/24 RE exercise: main exercise is walking - cannot do much more than that.  RE obesity: has lost 34 lbs. so far. Has not lost any more weight but finds exercise difficult due to back pain.  Used to do water exercise and enjoyed that.  RE BRIGID: uses CPAP 4-5 times a week. does not bring on vacation.  RE bilat hand pain and numbness: had surgery in the past - was told would last 10 years and more time has passed than that. She is ready to go back for a reassessment - thinks she may need revision surgery.   Prior 10/17/23 On Ozempic since 8/22 Last ophtho visit: 10/3/23 - no retinopathy change in vision: no lump in neck? change in voice? difficulty swallowing? - no abdominal pain? - no She is feeling great - went last month to [place] and [place].  Had a wonderful time. Did a lot of walking every day. Even though she had a lot to eat, got so much exercise that she actually still lost a couple of pounds.  she reports that now her pharmacy is out of Ozempic - unclear when they will have it back in stock, so she is out of medication.  Will send rx for Wegovy to her pharmacy as an alternative - she is agreeable to the change.   RE HTN: In target range on current meds.  Continue amlodipine valsartan 10/160 daily.  RE HLD: On atorvastatin 20 mg daily.  No side effects.  Due to check labs today.  No change in meds. Re: Pre-DM: Continues to work on diet and lifestyle modifications.  Not on any medication.  Due to recheck hemoglobin A1c today. Re: Leukocytosis/thrombocytosis: Recheck CBC today. Re: Screening for breast cancer: Due for mammogram and ultrasound.  Prescriptions supplied= she will call to schedule appointment. Got both flu shot and the COVID booster early September. Follow-up for CPE.

## 2025-06-19 NOTE — HISTORY OF PRESENT ILLNESS
[de-identified] : 73 yo F with h/o HTN, HLD, preDM, CTS s/p surg, obesity, BRIGID, leukocytosis, thrombocytosis, multiarticular arthritis Chart reviewed today in preparation for visit.   6/19/25 Seeing Pain specialist for back.  Tried epidural which didnt help.  Now trying nerve block.  Dr Delatrore - sees in [place].  Also given pain medication - she uses sparingly.  will call with name.  s/p CTS surgery RE obesity: seeing obesity medicine - using Zepboound - has lost 10 lbs. No side effects. RE BRIGID - using CPAP nightlyy.  has a new machine. very comfortable. mask over nose only. Seen by pulm 1/27/25 - for f/u of OSARE Immunization: got most recent COVID booster  Prior RE CTS: has been seeing hand specialist, Dr Parkinson. s/p injection x 2 RE obesity: on GLP1 (Trulicity - then Wegovy) - lost about 35 lbs as of last visit but noticed wt bp by 6 lbs at last hand surg visit. Last rx was in Feb 2024 - since then, insurance has refused to cover. She is very frustrated and wonders if there are any alternative medications or strategies.  RE BRIGID: uses CPAP but not every night RE HTN: Generally under good control on amlodipine and valsartan hand 10/160 RE HLD: On atorvastatin 20 mg daily.  No side effects   Prior 2/20/24 RE exercise: main exercise is walking - cannot do much more than that.  RE obesity: has lost 34 lbs. so far. Has not lost any more weight but finds exercise difficult due to back pain.  Used to do water exercise and enjoyed that.  RE BRIGID: uses CPAP 4-5 times a week. does not bring on vacation.  RE bilat hand pain and numbness: had surgery in the past - was told would last 10 years and more time has passed than that. She is ready to go back for a reassessment - thinks she may need revision surgery.   Prior 10/17/23 On Ozempic since 8/22 Last ophtho visit: 10/3/23 - no retinopathy change in vision: no lump in neck? change in voice? difficulty swallowing? - no abdominal pain? - no She is feeling great - went last month to [place] and [place].  Had a wonderful time. Did a lot of walking every day. Even though she had a lot to eat, got so much exercise that she actually still lost a couple of pounds.  she reports that now her pharmacy is out of Ozempic - unclear when they will have it back in stock, so she is out of medication.  Will send rx for Wegovy to her pharmacy as an alternative - she is agreeable to the change.   RE HTN: In target range on current meds.  Continue amlodipine valsartan 10/160 daily.  RE HLD: On atorvastatin 20 mg daily.  No side effects.  Due to check labs today.  No change in meds. Re: Pre-DM: Continues to work on diet and lifestyle modifications.  Not on any medication.  Due to recheck hemoglobin A1c today. Re: Leukocytosis/thrombocytosis: Recheck CBC today. Re: Screening for breast cancer: Due for mammogram and ultrasound.  Prescriptions supplied= she will call to schedule appointment. Got both flu shot and the COVID booster early September. Follow-up for CPE.

## 2025-07-16 NOTE — REASON FOR VISIT
[Home] : at home, [unfilled] , at the time of the visit. [Medical Office: ([facility])___] : at the medical office located in  [Telehealth (audio & video)] : This visit was provided via telehealth using real-time 2-way audio visual technology. [Verbal consent obtained from patient] : the patient, [unfilled] [Follow-Up] : a follow-up visit [Sleep Apnea] : sleep apnea

## 2025-07-16 NOTE — REASON FOR VISIT
[Home] : at home, [unfilled] , at the time of the visit. [Medical Office: ([facility])___] : at the medical office located in  [Telehealth (audio & video)] : This visit was provided via telehealth using real-time 2-way audio visual technology. [Verbal consent obtained from patient] : the patient, [unfilled] [Follow-Up] : a follow-up visit [Sleep Apnea] : sleep apnea

## 2025-07-17 NOTE — HISTORY OF PRESENT ILLNESS
[To Bed: ___] : ~he/she~ goes to bed at [unfilled] [Arises: ___] : arises at [unfilled] [Sleep Onset Latency: ___ minutes] : sleep onset latency of [unfilled] minutes reported [Nocturnal Awakenings: ___] : ~he/she~ typically has [unfilled] nocturnal awakenings [WASO: ___] : Wake time after sleep onset is [unfilled] [Daytime Sleep: ___] : daytime sleep: [unfilled] [FreeTextEntry1] : Ms. Nash is 75-year-old female with moderate BRIGID on CPAP therapy who presents via video for follow up.  PMHx: obesity, DM, lower back pain, HTN, GERD  PSG 18 with evidence of moderate BRIGID, AHI of 15/hr, severe in REM. CPAP titration study 10/12/2017: the optimal CPAP pressure was 8 CMH20 2018 PSG Moderate BRIGID AHI 15/hr  TX: Airsense 11 Autoset setup on 3/24/2025 by DME: [employer]  Patient received new CPAP device 3 month ago and has been compliant.  She is currently using nasal mask. Patient is tolerating mask and pressure well.  Patient is currently on Zepbound and lost 10 lbs. Goal weight 175-180lbs.   Medication list updated.   EPWORTH SLEEPINESS SCALE How likely are you to doze off or fall asleep in the situations described below, in contrast to feeling just tired? This refers to your usual way of life in recent times. Even if you haven't done some of these things recently, try to work out how they would have affected you. Use the following scale to choose one most appropriate number for each situation.......Chance of dozin= never. 1= slight. 2= moderate. 3= high. CHANCE OF DOZING............SITUATION 3.............................................Sitting and reading 2.............................................Watching TV 0.............................................Sitting inactive in a public place (eg a theatre or a meeting) 1.............................................As a passenger in a car for an hour without a break 3.............................................Lying down to rest in the afternoon when circumstances permit 0.............................................Sitting and talking to someone 1.............................................Sitting quietly after lunch without alcohol 0.............................................In a car, while stopped for a few minutes in traffic  10............................................TOTAL ESS SCORE

## 2025-07-17 NOTE — REVIEW OF SYSTEMS
[Recent Wt Loss (___ Lbs)] : recent [unfilled] ~Ulb weight loss [A.M. Dry Mouth] : a.m. dry mouth [Obesity] : obesity [Diabetes] : diabetes  [Negative] : Psychiatric

## 2025-07-17 NOTE — ASSESSMENT
[FreeTextEntry1] : Ms. Nash is 75-year-old female with moderate BRIGID on CPAP therapy who presents via video for follow up.  PMHx: obesity, DM, lower back pain, HTN, GERD  Discussed with patient CPAP compliance data: Compliant 80% of days, 80% for >4-hrs, average 5hrs 34mins. AHI 1.0/hr Leaks 15.8L/m. Patient is using and benefitting from CPAP therapy.   Annual follow-up, sooner if needed.

## 2025-07-17 NOTE — HISTORY OF PRESENT ILLNESS
[To Bed: ___] : ~he/she~ goes to bed at [unfilled] [Arises: ___] : arises at [unfilled] [Sleep Onset Latency: ___ minutes] : sleep onset latency of [unfilled] minutes reported [Nocturnal Awakenings: ___] : ~he/she~ typically has [unfilled] nocturnal awakenings [WASO: ___] : Wake time after sleep onset is [unfilled] [Daytime Sleep: ___] : daytime sleep: [unfilled] [FreeTextEntry1] : Ms. Nash is 75-year-old female with moderate BRIGID on CPAP therapy who presents via video for follow up.  PMHx: obesity, DM, lower back pain, HTN, GERD  PSG 18 with evidence of moderate BRIGID, AHI of 15/hr, severe in REM. CPAP titration study 10/12/2017: the optimal CPAP pressure was 8 CMH20 2018 PSG Moderate BRIGID AHI 15/hr  TX: Airsense 11 Autoset setup on 3/24/2025 by DME: [employer]  Patient received new CPAP device 3 month ago and has been compliant.  She is currently using nasal mask. Patient is tolerating mask and pressure well.  Patient is currently on Zepbound and lost 10 lbs. Goal weight 175-180lbs.   Medication list updated.   EPWORTH SLEEPINESS SCALE How likely are you to doze off or fall asleep in the situations described below, in contrast to feeling just tired? This refers to your usual way of life in recent times. Even if you haven't done some of these things recently, try to work out how they would have affected you. Use the following scale to choose one most appropriate number for each situation.......Chance of dozin= never. 1= slight. 2= moderate. 3= high. CHANCE OF DOZING............SITUATION 3.............................................Sitting and reading 2.............................................Watching TV 0.............................................Sitting inactive in a public place (eg a theatre or a meeting) 1.............................................As a passenger in a car for an hour without a break 3.............................................Lying down to rest in the afternoon when circumstances permit 0.............................................Sitting and talking to someone 1.............................................Sitting quietly after lunch without alcohol 0.............................................In a car, while stopped for a few minutes in traffic  10............................................TOTAL ESS SCORE